# Patient Record
Sex: MALE | Race: WHITE | NOT HISPANIC OR LATINO | URBAN - METROPOLITAN AREA
[De-identification: names, ages, dates, MRNs, and addresses within clinical notes are randomized per-mention and may not be internally consistent; named-entity substitution may affect disease eponyms.]

---

## 2020-10-08 ENCOUNTER — APPOINTMENT (RX ONLY)
Dept: URBAN - METROPOLITAN AREA CLINIC 173 | Facility: CLINIC | Age: 60
Setting detail: DERMATOLOGY
End: 2020-10-08

## 2020-10-08 DIAGNOSIS — Z41.9 ENCOUNTER FOR PROCEDURE FOR PURPOSES OTHER THAN REMEDYING HEALTH STATE, UNSPECIFIED: ICD-10-CM

## 2020-10-08 PROCEDURE — ? DYSPORT

## 2020-10-08 PROCEDURE — ? COSMETIC CONSULTATION: BOTULINUM TOXIN

## 2020-10-08 NOTE — PROCEDURE: DYSPORT
Show Right And Left Brow Units: No
Additional Area 1 Units: 54
Expiration Date (Month Year): 10/31/20
L Brow Units: 0
Show Additional Area 5: Yes
Post-Care Instructions: Patient instructed to not lie down for 4 hours and limit physical activity for 24 hours.
Price (Use Numbers Only, No Special Characters Or $): 405
Detail Level: Zone
Lot #: [id number]
Consent: Written consent obtained. Risks include but not limited to lid/brow ptosis, bruising, swelling, diplopia, temporary effect, incomplete chemical denervation.
Additional Area 1 Location: Face
Dilution (U/ 0.1cc): 10

## 2020-10-22 ENCOUNTER — APPOINTMENT (RX ONLY)
Dept: URBAN - METROPOLITAN AREA CLINIC 173 | Facility: CLINIC | Age: 60
Setting detail: DERMATOLOGY
End: 2020-10-22

## 2020-10-22 DIAGNOSIS — Z41.9 ENCOUNTER FOR PROCEDURE FOR PURPOSES OTHER THAN REMEDYING HEALTH STATE, UNSPECIFIED: ICD-10-CM

## 2020-10-22 PROCEDURE — ? PATIENT SPECIFIC COUNSELING

## 2020-10-22 NOTE — PROCEDURE: PATIENT SPECIFIC COUNSELING
Patient looks more rested. Patient is considering to do upper and lower eyelid surgery with Dr Houston.
Detail Level: Zone